# Patient Record
Sex: MALE | Race: OTHER | HISPANIC OR LATINO | URBAN - METROPOLITAN AREA
[De-identification: names, ages, dates, MRNs, and addresses within clinical notes are randomized per-mention and may not be internally consistent; named-entity substitution may affect disease eponyms.]

---

## 2022-04-05 ENCOUNTER — OFFICE VISIT (OUTPATIENT)
Dept: URBAN - METROPOLITAN AREA TELEHEALTH 2 | Facility: TELEHEALTH | Age: 67
End: 2022-04-05

## 2022-04-26 ENCOUNTER — OFFICE VISIT (OUTPATIENT)
Dept: URBAN - METROPOLITAN AREA CLINIC 63 | Facility: CLINIC | Age: 67
End: 2022-04-26

## 2022-04-29 LAB
% SATURATION: (no result)
ABSOLUTE BASOPHILS: (no result)
ABSOLUTE EOSINOPHILS: (no result)
ABSOLUTE LYMPHOCYTES: (no result)
ABSOLUTE MONOCYTES: (no result)
ABSOLUTE NEUTROPHILS: (no result)
ALBUMIN/GLOBULIN RATIO: (no result)
ALBUMIN: (no result)
ALKALINE PHOSPHATASE: (no result)
ALPHA FETOPROTEIN, TUMOR MARKER: (no result)
ALT: (no result)
ANACHOICE(R) SCREEN: NEGATIVE
AST: (no result)
BASOPHILS: (no result)
BILIRUBIN, DIRECT: (no result)
BILIRUBIN, TOTAL: (no result)
BUN/CREATININE RATIO: (no result)
CALCIUM: (no result)
CARBON DIOXIDE: (no result)
CERULOPLASMIN: (no result)
CHLORIDE: (no result)
CREATININE: (no result)
EGFR AFRICAN AMERIC: (no result)
EGFR NON-AFR. AMERI: (no result)
EOSINOPHILS: (no result)
FERRITIN: (no result)
GLOBULIN: (no result)
GLUCOSE: (no result)
HCV RNA, QUANTITATIVE REAL TI: (no result)
HCV RNA, QUANTITATIVE REAL TIME: (no result)
HEMATOCRIT: (no result)
HEMOGLOBIN: (no result)
HEPATITIS A AB, TOTAL: REACTIVE
HEPATITIS A IGM: (no result)
HEPATITIS B CORE AB TOTAL: (no result)
HEPATITIS B CORE ANTIBODY (IGM): (no result)
HEPATITIS B SURFACE ANTIBODY QL: (no result)
HEPATITIS B SURFACE ANTIGEN: (no result)
IMMUNOGLOBULIN A: (no result)
IMMUNOGLOBULIN G: (no result)
IMMUNOGLOBULIN M: (no result)
INR: 1.3
IRON BINDING CAPACITY: (no result)
IRON, TOTAL: (no result)
LYMPHOCYTES: (no result)
MCH: (no result)
MCHC: (no result)
MCV: (no result)
MITOCHONDRIAL AB SCREEN: NEGATIVE
MONOCYTES: (no result)
MPV: (no result)
NEUTROPHILS: (no result)
PLATELET COUNT: (no result)
POTASSIUM: (no result)
PROTEIN, TOTAL: (no result)
PT: (no result)
RDW: (no result)
RED BLOOD CELL COUNT: (no result)
SMOOTH MUSCLE AB SCREEN: NEGATIVE
SODIUM: (no result)
UREA NITROGEN (BUN): (no result)
WHITE BLOOD CELL COUNT: (no result)

## 2022-06-07 ENCOUNTER — OFFICE VISIT (OUTPATIENT)
Dept: URBAN - METROPOLITAN AREA TELEHEALTH 2 | Facility: TELEHEALTH | Age: 67
End: 2022-06-07

## 2022-06-20 ENCOUNTER — OFFICE VISIT (OUTPATIENT)
Dept: URBAN - METROPOLITAN AREA MEDICAL CENTER 14 | Facility: MEDICAL CENTER | Age: 67
End: 2022-06-20

## 2022-07-09 ENCOUNTER — TELEPHONE ENCOUNTER (OUTPATIENT)
Dept: URBAN - METROPOLITAN AREA CLINIC 121 | Facility: CLINIC | Age: 67
End: 2022-07-09

## 2022-07-09 RX ORDER — SIMVASTATIN 40 MG/1
ONCE A DAY TABLET, FILM COATED ORAL ONCE A DAY
Refills: 3 | OUTPATIENT
Start: 2017-01-20 | End: 2017-03-21

## 2022-07-09 RX ORDER — SIMVASTATIN 40 MG/1
ONCE A DAY TABLET, FILM COATED ORAL ONCE A DAY
Refills: 3 | OUTPATIENT
Start: 2016-10-06 | End: 2017-01-20

## 2022-07-09 RX ORDER — NADOLOL 20 MG/1
TABLET ORAL
Refills: 0 | OUTPATIENT
Start: 2017-03-21 | End: 2017-06-12

## 2022-07-09 RX ORDER — SITAGLIPTIN PHOSPHATE 100 MG
TABLET ORAL
Refills: 0 | OUTPATIENT
Start: 2016-09-07 | End: 2016-10-06

## 2022-07-09 RX ORDER — GLIMEPIRIDE 2 MG/1
TABLET ORAL TWICE A DAY
Refills: 0 | OUTPATIENT
Start: 2017-06-12 | End: 2019-07-09

## 2022-07-09 RX ORDER — LOSARTAN POTASSIUM 50 MG/1
TABLET, FILM COATED ORAL
Refills: 0 | OUTPATIENT
Start: 2016-09-07 | End: 2016-10-06

## 2022-07-09 RX ORDER — COLCHICINE 0.6 MG/1
TABLET, FILM COATED ORAL TWICE A DAY
Refills: 0 | OUTPATIENT
Start: 2017-06-12 | End: 2019-07-09

## 2022-07-09 RX ORDER — COLCHICINE 0.6 MG/1
TABLET, FILM COATED ORAL TWICE A DAY
Refills: 0 | OUTPATIENT
Start: 2016-09-07 | End: 2016-10-06

## 2022-07-09 RX ORDER — LOSARTAN POTASSIUM 50 MG/1
TABLET, FILM COATED ORAL
Refills: 0 | OUTPATIENT
Start: 2017-03-21 | End: 2017-06-12

## 2022-07-09 RX ORDER — LACTULOSE 10 G/15ML
TWICE A DAY TAKE 30 ML PO BID SOLUTION ORAL TWICE A DAY
Refills: 0 | OUTPATIENT
Start: 2016-10-06 | End: 2017-06-12

## 2022-07-09 RX ORDER — SITAGLIPTIN PHOSPHATE 100 MG
TABLET ORAL
Refills: 0 | OUTPATIENT
Start: 2016-10-06 | End: 2017-03-21

## 2022-07-09 RX ORDER — OMEPRAZOLE 20 MG/1
CAPSULE, DELAYED RELEASE ORAL
Refills: 0 | OUTPATIENT
Start: 2017-03-21 | End: 2017-06-12

## 2022-07-09 RX ORDER — LORATADINE 10 MG/1
TABLET ORAL
Refills: 0 | OUTPATIENT
Start: 2017-03-21 | End: 2017-06-12

## 2022-07-09 RX ORDER — METFORMIN HCL 1000 MG/1
TABLET ORAL TWICE A DAY
Refills: 0 | OUTPATIENT
Start: 2017-03-21 | End: 2017-06-12

## 2022-07-09 RX ORDER — METFORMIN HCL 1000 MG/1
TABLET ORAL TWICE A DAY
Refills: 0 | OUTPATIENT
Start: 2016-10-06 | End: 2017-03-21

## 2022-07-09 RX ORDER — OMEPRAZOLE 20 MG/1
CAPSULE, DELAYED RELEASE ORAL
Refills: 0 | OUTPATIENT
Start: 2017-06-12 | End: 2019-07-09

## 2022-07-09 RX ORDER — OMEPRAZOLE 20 MG/1
ONCE A DAY CAPSULE, DELAYED RELEASE ORAL ONCE A DAY
Refills: 1 | OUTPATIENT
Start: 2017-02-13 | End: 2017-03-21

## 2022-07-09 RX ORDER — AMLODIPINE BESYLATE 2.5 MG/1
TABLET ORAL
Refills: 0 | OUTPATIENT
Start: 2016-09-07 | End: 2016-10-06

## 2022-07-09 RX ORDER — METFORMIN HCL 1000 MG/1
TABLET ORAL TWICE A DAY
Refills: 0 | OUTPATIENT
Start: 2017-06-12 | End: 2019-07-09

## 2022-07-09 RX ORDER — LOSARTAN POTASSIUM 50 MG/1
TABLET, FILM COATED ORAL
Refills: 0 | OUTPATIENT
Start: 2016-10-06 | End: 2017-03-21

## 2022-07-09 RX ORDER — FUROSEMIDE 20 MG/1
ONCE A DAY TABLET ORAL ONCE A DAY
Refills: 3 | OUTPATIENT
Start: 2017-03-21 | End: 2017-06-27

## 2022-07-09 RX ORDER — FEBUXOSTAT 80 MG/1
TABLET ORAL
Refills: 0 | OUTPATIENT
Start: 2016-09-07 | End: 2016-10-06

## 2022-07-09 RX ORDER — RIFAXIMIN 550 MG/1
TWICE A DAY TABLET ORAL TWICE A DAY
Refills: 3 | OUTPATIENT
Start: 2017-03-21 | End: 2017-06-12

## 2022-07-09 RX ORDER — LORATADINE 10 MG/1
TABLET ORAL
Refills: 0 | OUTPATIENT
Start: 2016-09-07 | End: 2016-10-06

## 2022-07-09 RX ORDER — OMEPRAZOLE 20 MG/1
ONCE A DAY CAPSULE, DELAYED RELEASE ORAL ONCE A DAY
Refills: 3 | OUTPATIENT
Start: 2016-10-06 | End: 2017-02-13

## 2022-07-09 RX ORDER — GLIMEPIRIDE 2 MG/1
TABLET ORAL TWICE A DAY
Refills: 0 | OUTPATIENT
Start: 2016-10-06 | End: 2017-03-21

## 2022-07-09 RX ORDER — GLIMEPIRIDE 2 MG/1
TABLET ORAL TWICE A DAY
Refills: 0 | OUTPATIENT
Start: 2016-09-07 | End: 2016-10-06

## 2022-07-09 RX ORDER — NADOLOL 20 MG/1
ONCE A DAY TABLET ORAL ONCE A DAY
Refills: 0 | OUTPATIENT
Start: 2017-01-09 | End: 2017-03-21

## 2022-07-09 RX ORDER — AMLODIPINE BESYLATE 2.5 MG/1
TABLET ORAL
Refills: 0 | OUTPATIENT
Start: 2017-03-21 | End: 2017-06-12

## 2022-07-09 RX ORDER — FEBUXOSTAT 80 MG/1
TABLET ORAL
Refills: 0 | OUTPATIENT
Start: 2019-07-09 | End: 2019-07-09

## 2022-07-09 RX ORDER — LACTULOSE 10 G/15ML
TWICE A DAY TAKE 30 ML PO BID SOLUTION ORAL TWICE A DAY
Refills: 0 | OUTPATIENT
Start: 2017-06-12 | End: 2019-07-09

## 2022-07-09 RX ORDER — SUCRALFATE 1 G/1
TWICE A DAY TABLET ORAL TWICE A DAY
Refills: 1 | OUTPATIENT
Start: 2016-10-06 | End: 2016-11-28

## 2022-07-09 RX ORDER — NADOLOL 20 MG/1
ONCE A DAY TABLET ORAL ONCE A DAY
Refills: 2 | OUTPATIENT
Start: 2017-06-12 | End: 2019-07-09

## 2022-07-09 RX ORDER — FEBUXOSTAT 80 MG/1
TABLET ORAL
Refills: 0 | OUTPATIENT
Start: 2017-06-12 | End: 2019-07-09

## 2022-07-09 RX ORDER — COLCHICINE 0.6 MG/1
TABLET, FILM COATED ORAL TWICE A DAY
Refills: 0 | OUTPATIENT
Start: 2016-10-06 | End: 2017-03-21

## 2022-07-09 RX ORDER — FEBUXOSTAT 80 MG/1
TABLET ORAL
Refills: 0 | OUTPATIENT
Start: 2017-03-21 | End: 2017-06-12

## 2022-07-09 RX ORDER — SITAGLIPTIN PHOSPHATE 100 MG
TABLET ORAL
Refills: 0 | OUTPATIENT
Start: 2017-06-12 | End: 2019-07-09

## 2022-07-09 RX ORDER — COLCHICINE 0.6 MG/1
TABLET, FILM COATED ORAL TWICE A DAY
Refills: 0 | OUTPATIENT
Start: 2017-03-21 | End: 2017-06-12

## 2022-07-09 RX ORDER — SITAGLIPTIN PHOSPHATE 100 MG
TABLET ORAL
Refills: 0 | OUTPATIENT
Start: 2017-03-21 | End: 2017-06-12

## 2022-07-09 RX ORDER — AMLODIPINE BESYLATE 2.5 MG/1
TABLET ORAL
Refills: 0 | OUTPATIENT
Start: 2016-10-06 | End: 2017-03-21

## 2022-07-09 RX ORDER — LORATADINE 10 MG/1
TABLET ORAL
Refills: 0 | OUTPATIENT
Start: 2016-10-06 | End: 2017-03-21

## 2022-07-09 RX ORDER — SIMVASTATIN 40 MG/1
TABLET, FILM COATED ORAL
Refills: 0 | OUTPATIENT
Start: 2017-03-21 | End: 2017-06-12

## 2022-07-09 RX ORDER — METFORMIN HCL 1000 MG/1
TABLET ORAL TWICE A DAY
Refills: 0 | OUTPATIENT
Start: 2016-09-07 | End: 2016-10-06

## 2022-07-09 RX ORDER — GLIMEPIRIDE 2 MG/1
TABLET ORAL TWICE A DAY
Refills: 0 | OUTPATIENT
Start: 2017-03-21 | End: 2017-06-12

## 2022-07-09 RX ORDER — FEBUXOSTAT 80 MG/1
TABLET ORAL
Refills: 0 | OUTPATIENT
Start: 2016-10-06 | End: 2017-03-21

## 2022-07-09 RX ORDER — RIFAXIMIN 550 MG/1
TWICE A DAY TABLET ORAL TWICE A DAY
Refills: 3 | OUTPATIENT
Start: 2017-06-12 | End: 2019-07-09

## 2022-07-10 ENCOUNTER — TELEPHONE ENCOUNTER (OUTPATIENT)
Dept: URBAN - METROPOLITAN AREA CLINIC 121 | Facility: CLINIC | Age: 67
End: 2022-07-10

## 2022-07-10 RX ORDER — TAMSULOSIN HYDROCHLORIDE 0.4 MG/1
CAPSULE ORAL
Refills: 0 | Status: ACTIVE | COMMUNITY
Start: 2019-07-09

## 2022-07-10 RX ORDER — ATORVASTATIN CALCIUM 20 MG/1
TABLET, FILM COATED ORAL
Refills: 0 | Status: ACTIVE | COMMUNITY
Start: 2019-07-09

## 2022-07-10 RX ORDER — DAPAGLIFLOZIN 10 MG/1
TABLET, FILM COATED ORAL
Refills: 0 | Status: ACTIVE | COMMUNITY
Start: 2019-07-09

## 2022-07-10 RX ORDER — BISACODYL 5 MG/1
TABLET, SUGAR COATED ORAL TWICE A DAY
Refills: 0 | Status: ACTIVE | COMMUNITY
Start: 2019-07-09

## 2022-07-10 RX ORDER — CETIRIZINE HYDROCHLORIDE 10 MG/1
TABLET, FILM COATED ORAL
Refills: 0 | Status: ACTIVE | COMMUNITY
Start: 2019-07-09

## 2022-07-10 RX ORDER — INSULIN GLARGINE AND LIXISENATIDE 100; 33 U/ML; UG/ML
INJECTION, SOLUTION SUBCUTANEOUS
Refills: 0 | Status: ACTIVE | COMMUNITY
Start: 2019-07-09

## 2022-07-10 RX ORDER — NADOLOL 20 MG/1
TABLET ORAL
Refills: 0 | Status: ACTIVE | COMMUNITY
Start: 2019-07-09

## 2022-07-10 RX ORDER — GABAPENTIN 300 MG/1
CAPSULE ORAL TWICE A DAY
Refills: 0 | Status: ACTIVE | COMMUNITY
Start: 2019-07-09

## 2022-07-10 RX ORDER — SUCRALFATE 1 G/1
TOMAR UNA TABLETA VIA ORAL DOS VECES AL DIA TABLET ORAL
Refills: 0 | Status: ACTIVE | COMMUNITY
Start: 2016-11-28

## 2022-07-10 RX ORDER — COLCHICINE 0.6 MG/1
TABLET, FILM COATED ORAL TWICE A DAY
Refills: 0 | Status: ACTIVE | COMMUNITY
Start: 2019-07-09

## 2022-07-10 RX ORDER — FUROSEMIDE 20 MG/1
TOMAR 1 TABLETA POR LA BOCA UNA VEZ AL DIA TABLET ORAL
Refills: 3 | Status: ACTIVE | COMMUNITY
Start: 2017-06-27

## 2022-07-10 RX ORDER — INSULIN ASPART 100 [IU]/ML
30 UNIT DAILY INJECTION, SOLUTION INTRAVENOUS; SUBCUTANEOUS
Refills: 0 | Status: ACTIVE | COMMUNITY
Start: 2019-07-09

## 2022-07-10 RX ORDER — METFORMIN HCL 1000 MG/1
TABLET ORAL TWICE A DAY
Refills: 0 | Status: ACTIVE | COMMUNITY
Start: 2019-07-09

## 2022-07-10 RX ORDER — FEBUXOSTAT 80 MG/1
TABLET ORAL
Refills: 0 | Status: ACTIVE | COMMUNITY
Start: 2019-07-09

## 2022-07-10 RX ORDER — RIFAXIMIN 550 MG/1
TWICE A DAY TABLET ORAL TWICE A DAY
Refills: 3 | Status: ACTIVE | COMMUNITY
Start: 2016-10-06

## 2022-07-10 RX ORDER — LACTULOSE 10 G/15ML
TWICE A DAY 30 ML PO SOLUTION ORAL TWICE A DAY
Refills: 4 | Status: ACTIVE | COMMUNITY
Start: 2017-03-21

## 2022-07-13 ENCOUNTER — OFFICE VISIT (OUTPATIENT)
Dept: URBAN - METROPOLITAN AREA CLINIC 63 | Facility: CLINIC | Age: 67
End: 2022-07-13

## 2022-07-30 ENCOUNTER — TELEPHONE ENCOUNTER (OUTPATIENT)
Age: 67
End: 2022-07-30

## 2022-07-31 ENCOUNTER — TELEPHONE ENCOUNTER (OUTPATIENT)
Age: 67
End: 2022-07-31

## 2022-08-09 ENCOUNTER — OFFICE VISIT (OUTPATIENT)
Dept: URBAN - METROPOLITAN AREA TELEHEALTH 1 | Facility: TELEHEALTH | Age: 67
End: 2022-08-09
Payer: COMMERCIAL

## 2022-08-09 DIAGNOSIS — Z12.11 COLON CANCER SCREENING: ICD-10-CM

## 2022-08-09 PROCEDURE — 99213 OFFICE O/P EST LOW 20 MIN: CPT | Performed by: INTERNAL MEDICINE

## 2022-08-09 RX ORDER — DAPAGLIFLOZIN 10 MG/1
TABLET, FILM COATED ORAL
Refills: 0 | Status: ACTIVE | COMMUNITY
Start: 2019-07-09

## 2022-08-09 RX ORDER — TAMSULOSIN HYDROCHLORIDE 0.4 MG/1
CAPSULE ORAL
Refills: 0 | Status: ACTIVE | COMMUNITY
Start: 2019-07-09

## 2022-08-09 RX ORDER — LACTULOSE 10 G/15ML
TWICE A DAY 30 ML PO SOLUTION ORAL TWICE A DAY
Refills: 4 | Status: ACTIVE | COMMUNITY
Start: 2017-03-21

## 2022-08-09 RX ORDER — RIFAXIMIN 550 MG/1
TWICE A DAY TABLET ORAL TWICE A DAY
Refills: 3 | Status: ACTIVE | COMMUNITY
Start: 2016-10-06

## 2022-08-09 RX ORDER — BISACODYL 5 MG/1
TABLET, SUGAR COATED ORAL TWICE A DAY
Refills: 0 | Status: ACTIVE | COMMUNITY
Start: 2019-07-09

## 2022-08-09 RX ORDER — INSULIN GLARGINE AND LIXISENATIDE 100; 33 U/ML; UG/ML
INJECTION, SOLUTION SUBCUTANEOUS
Refills: 0 | Status: ACTIVE | COMMUNITY
Start: 2019-07-09

## 2022-08-09 RX ORDER — FUROSEMIDE 20 MG/1
TOMAR 1 TABLETA POR LA BOCA UNA VEZ AL DIA TABLET ORAL
Refills: 3 | Status: ACTIVE | COMMUNITY
Start: 2017-06-27

## 2022-08-09 RX ORDER — INSULIN ASPART 100 [IU]/ML
30 UNIT DAILY INJECTION, SOLUTION INTRAVENOUS; SUBCUTANEOUS
Refills: 0 | Status: ACTIVE | COMMUNITY
Start: 2019-07-09

## 2022-08-09 RX ORDER — METFORMIN HCL 1000 MG/1
TABLET ORAL TWICE A DAY
Refills: 0 | Status: ACTIVE | COMMUNITY
Start: 2019-07-09

## 2022-08-09 RX ORDER — FEBUXOSTAT 80 MG/1
TABLET ORAL
Refills: 0 | Status: ACTIVE | COMMUNITY
Start: 2019-07-09

## 2022-08-09 RX ORDER — NADOLOL 20 MG/1
TABLET ORAL
Refills: 0 | Status: ACTIVE | COMMUNITY
Start: 2019-07-09

## 2022-08-09 RX ORDER — GABAPENTIN 300 MG/1
CAPSULE ORAL TWICE A DAY
Refills: 0 | Status: ACTIVE | COMMUNITY
Start: 2019-07-09

## 2022-08-09 RX ORDER — CETIRIZINE HYDROCHLORIDE 10 MG/1
TABLET, FILM COATED ORAL
Refills: 0 | Status: ACTIVE | COMMUNITY
Start: 2019-07-09

## 2022-08-09 RX ORDER — COLCHICINE 0.6 MG/1
TABLET, FILM COATED ORAL TWICE A DAY
Refills: 0 | Status: ACTIVE | COMMUNITY
Start: 2019-07-09

## 2022-08-09 RX ORDER — ATORVASTATIN CALCIUM 20 MG/1
TABLET, FILM COATED ORAL
Refills: 0 | Status: ACTIVE | COMMUNITY
Start: 2019-07-09

## 2022-08-09 RX ORDER — SUCRALFATE 1 G/1
TOMAR UNA TABLETA VIA ORAL DOS VECES AL DIA TABLET ORAL
Refills: 0 | Status: ACTIVE | COMMUNITY
Start: 2016-11-28

## 2022-08-09 NOTE — HPI-TODAY'S VISIT:
Patient seen today via telehealth by agreement and consent of patient in light of current COVID-19 pandemic. I used video conferencing during the visit. The patient encounter is appropriate and reasonable under the circumstances given the patient's particular presentation at this time. The patient has been advised of the followin) the potential risks and limitations of this mode of treatment (including but not limited to the absence of in-person examination); 2) the right to refuse telehealth services at any point without affecting the right to future care; 3) the right to receive in-person services, included immediately after this consultation if an urgent need arises; 4) information, including identifiable images or information from this telehealth consult, will only be shared in accordance with HIPPA regulations. Any and all of the patient's and/or patient's family member's questions on this issue have been answered. The patient has verbally consented to be treated via telehealth services. The patient has also been advised to contact this office for worsening conditions or problems, and seek emergency medical treatment and/or call 911 if the patient deems either necessary.   More than half of the face-to-face time used for counseling and coordination of care.  The patient received telehealth services at: home  10/16Patient with good general state, with the complaint above.MELD score is 16Elevated CEA and liver enzymes Normal  AFP.Doppler done  No ascites and normal portal flowRUQ us: smooth liver contour, no ascites, no cholecystitis.EGD with evidence of esophageal varice grade I-II and portal gastropathy No sign of infection, edema, bleeding or AMS. Wife state patient is lethargic at times and complaints  of difficulty to concentrate in his thoughts.Plan:Continue with Nadolol, will start lactulose and Xifaxan, no diuretics needs for now, he will have control in his fluids intake.Referral to a tertiary center. Probably TIP.3/17Patient with elevated ammonia level. Patient and wife advised that he should not drive due to the high risk of  have a Car accident. due to encephalopathy. MELD score is 9.Patient with ascites, elevated ammonia, weakness tired, sleepy. Bilateral legs edema. Plan: Continue with Nadolol, lactulose, will start with furosemide 20 mg and xifaxan. Diet low in Na and fluid restrictionwill do abdomen US to evaluated for paracentesis / patient with no abdominal discomfort, claim some SOB and increasing in weight.Patient went to [facility] /  candidate for transplant.  : patient with cirrhosis with evidence of esophageal varices, no banding, next EGD in . no evidence of ascites will do ultrasound and will check ammonia and AFP. Will follow in 2 months.  : pateint comes after 2 years, pateint as no follow up with us for his cirrhosis will need labs and ultrasound  as per patient was check by his PCP. Will follow closkimberly and will review the labs to plan EGD and colonoscopy. heme positive in stool. Will plan first cardiac clearcamce and  will plan EGD and colonoscopy at [facility].  :  Patient with cirrhosis last seen in , 3 years ago with no further follow-up.  Patient had an upper endoscopy that year with evidence of esophageal varices grade 3 at that is post banding and food was pertaining to the stomach, reason why a colonoscopy could not be performed.  Was recommended close follow-up in few weeks to repeat endoscopy and prep for the colonoscopy, gastric empty study was performed and was normal.  Patient never follow-up with us, until now.Patient feels well in the past has a low meld score, will need labs and an ultrasound with Doppler of the abdomen.  Will check AFP, will calculate meld score, will check ammonia levels, and will do a close follow-up to arrange endoscopy and colonoscopy.  This follow-up will be in the office NO through telehealth for clinical evaluation and to plan endoscopic evaluation.  :  Patient had ultrasound of the liver and Doppler with normal direct flow within the portal splenic venous system.  SMV was not visualized.  Patent hepatic vein and artery.  Mild hepatitis and moderate splenomegaly.  Gallbladder and biliary ducts are normal, liver length of 15.6 cm the liver demonstrate normal shape and intravenous echotexture consistent with history of cirrhosis.  No mass was visualized.  Labs were performed with normal AFP just mild elevation of glucose and CBC with platelet count 141,000, normal hemoglobin.  Pending ammonia level, partial results. AFP 1.6. Will plan EGD and colonoscopy at the hospital. : Patient had  upper endoscopy and colonoscopy.  The upper endoscopy showed distal esophageal varices grade 1 through 2 no stigmata of recent bleeding no banding was performed, small hiatal hernia, portal gastropathy, small polyp of the antrum that could be inflammatory biopsies were taken, and normal duodenum.  The colonoscopy showed rectal varices, small internal hemorrhoids, and the preparation was inadequate in some areas will need to repeat colonoscopy within 3 months pathology report showed gastric polyp polypoid mucosa demonstrating reactive and hyperplastic features, no dysplasia or malignancy because of the poor preparation of the colon we will need to repeat colonoscopy again will need to be done at the hospital setting.

## 2022-08-16 ENCOUNTER — LAB OUTSIDE AN ENCOUNTER (OUTPATIENT)
Dept: URBAN - METROPOLITAN AREA TELEHEALTH 1 | Facility: TELEHEALTH | Age: 67
End: 2022-08-16

## 2022-10-31 ENCOUNTER — APPOINTMENT (RX ONLY)
Dept: URBAN - METROPOLITAN AREA CLINIC 148 | Facility: CLINIC | Age: 67
Setting detail: DERMATOLOGY
End: 2022-10-31

## 2022-10-31 DIAGNOSIS — B86 SCABIES: ICD-10-CM

## 2022-10-31 PROBLEM — L30.9 DERMATITIS, UNSPECIFIED: Status: ACTIVE | Noted: 2022-10-31

## 2022-10-31 PROCEDURE — ? COUNSELING

## 2022-10-31 PROCEDURE — 99204 OFFICE O/P NEW MOD 45 MIN: CPT

## 2022-10-31 PROCEDURE — ? PRESCRIPTION

## 2022-10-31 PROCEDURE — ? TREATMENT REGIMEN

## 2022-10-31 RX ORDER — IVERMECTIN 3 MG/1
TABLET ORAL
Qty: 12 | Refills: 0 | Status: ERX | COMMUNITY
Start: 2022-10-31

## 2022-10-31 RX ORDER — TRIAMCINOLONE ACETONIDE 1 MG/G
CREAM TOPICAL
Qty: 453.6 | Refills: 0 | Status: ERX | COMMUNITY
Start: 2022-10-31

## 2022-10-31 RX ORDER — PERMETHRIN 50 MG/G
CREAM TOPICAL QD
Qty: 120 | Refills: 0 | Status: ERX | COMMUNITY
Start: 2022-10-31

## 2022-10-31 RX ADMIN — PERMETHRIN: 50 CREAM TOPICAL at 00:00

## 2022-10-31 RX ADMIN — TRIAMCINOLONE ACETONIDE: 1 CREAM TOPICAL at 00:00

## 2022-10-31 RX ADMIN — IVERMECTIN: 3 TABLET ORAL at 00:00

## 2022-10-31 ASSESSMENT — LOCATION ZONE DERM: LOCATION ZONE: TRUNK

## 2022-10-31 ASSESSMENT — LOCATION SIMPLE DESCRIPTION DERM: LOCATION SIMPLE: LEFT UPPER BACK

## 2022-10-31 ASSESSMENT — LOCATION DETAILED DESCRIPTION DERM: LOCATION DETAILED: LEFT MEDIAL UPPER BACK

## 2024-05-29 ENCOUNTER — OFFICE VISIT (OUTPATIENT)
Dept: URBAN - METROPOLITAN AREA CLINIC 63 | Facility: CLINIC | Age: 69
End: 2024-05-29

## 2024-05-29 RX ORDER — INSULIN ASPART 100 [IU]/ML
30 UNIT DAILY INJECTION, SOLUTION INTRAVENOUS; SUBCUTANEOUS
Refills: 0 | Status: ACTIVE | COMMUNITY
Start: 2019-07-09

## 2024-05-29 RX ORDER — METFORMIN HCL 1000 MG/1
TABLET ORAL TWICE A DAY
Refills: 0 | Status: ACTIVE | COMMUNITY
Start: 2019-07-09

## 2024-05-29 RX ORDER — CETIRIZINE HYDROCHLORIDE 10 MG/1
TABLET, FILM COATED ORAL
Refills: 0 | Status: ACTIVE | COMMUNITY
Start: 2019-07-09

## 2024-05-29 RX ORDER — RIFAXIMIN 550 MG/1
TWICE A DAY TABLET ORAL TWICE A DAY
Refills: 3 | Status: ACTIVE | COMMUNITY
Start: 2016-10-06

## 2024-05-29 RX ORDER — SUCRALFATE 1 G/1
TOMAR UNA TABLETA VIA ORAL DOS VECES AL DIA TABLET ORAL
Refills: 0 | Status: ACTIVE | COMMUNITY
Start: 2016-11-28

## 2024-05-29 RX ORDER — GABAPENTIN 300 MG/1
CAPSULE ORAL TWICE A DAY
Refills: 0 | Status: ACTIVE | COMMUNITY
Start: 2019-07-09

## 2024-05-29 RX ORDER — TAMSULOSIN HYDROCHLORIDE 0.4 MG/1
CAPSULE ORAL
Refills: 0 | Status: ACTIVE | COMMUNITY
Start: 2019-07-09

## 2024-05-29 RX ORDER — NADOLOL 20 MG/1
TABLET ORAL
Refills: 0 | Status: ACTIVE | COMMUNITY
Start: 2019-07-09

## 2024-05-29 RX ORDER — COLCHICINE 0.6 MG/1
TABLET, FILM COATED ORAL TWICE A DAY
Refills: 0 | Status: ACTIVE | COMMUNITY
Start: 2019-07-09

## 2024-05-29 RX ORDER — DAPAGLIFLOZIN 10 MG/1
TABLET, FILM COATED ORAL
Refills: 0 | Status: ACTIVE | COMMUNITY
Start: 2019-07-09

## 2024-05-29 RX ORDER — BISACODYL 5 MG/1
TABLET, SUGAR COATED ORAL TWICE A DAY
Refills: 0 | Status: ACTIVE | COMMUNITY
Start: 2019-07-09

## 2024-05-29 RX ORDER — LACTULOSE 10 G/15ML
TWICE A DAY 30 ML PO SOLUTION ORAL TWICE A DAY
Refills: 4 | Status: ACTIVE | COMMUNITY
Start: 2017-03-21

## 2024-05-29 RX ORDER — FUROSEMIDE 20 MG/1
TOMAR 1 TABLETA POR LA BOCA UNA VEZ AL DIA TABLET ORAL
Refills: 3 | Status: ACTIVE | COMMUNITY
Start: 2017-06-27

## 2024-05-29 RX ORDER — INSULIN GLARGINE AND LIXISENATIDE 100; 33 U/ML; UG/ML
INJECTION, SOLUTION SUBCUTANEOUS
Refills: 0 | Status: ACTIVE | COMMUNITY
Start: 2019-07-09

## 2024-05-29 RX ORDER — FEBUXOSTAT 80 MG/1
TABLET ORAL
Refills: 0 | Status: ACTIVE | COMMUNITY
Start: 2019-07-09

## 2024-05-29 RX ORDER — ATORVASTATIN CALCIUM 20 MG/1
TABLET, FILM COATED ORAL
Refills: 0 | Status: ACTIVE | COMMUNITY
Start: 2019-07-09

## 2024-10-23 ENCOUNTER — OFFICE VISIT (OUTPATIENT)
Dept: URBAN - METROPOLITAN AREA CLINIC 63 | Facility: CLINIC | Age: 69
End: 2024-10-23

## 2024-10-23 RX ORDER — FUROSEMIDE 20 MG/1
TOMAR 1 TABLETA POR LA BOCA UNA VEZ AL DIA TABLET ORAL
Refills: 3 | Status: ACTIVE | COMMUNITY
Start: 2017-06-27

## 2024-10-23 RX ORDER — CETIRIZINE HYDROCHLORIDE 10 MG/1
TABLET, FILM COATED ORAL
Refills: 0 | Status: ACTIVE | COMMUNITY
Start: 2019-07-09

## 2024-10-23 RX ORDER — GABAPENTIN 300 MG/1
CAPSULE ORAL TWICE A DAY
Refills: 0 | Status: ACTIVE | COMMUNITY
Start: 2019-07-09

## 2024-10-23 RX ORDER — DAPAGLIFLOZIN 10 MG/1
TABLET, FILM COATED ORAL
Refills: 0 | Status: ACTIVE | COMMUNITY
Start: 2019-07-09

## 2024-10-23 RX ORDER — LACTULOSE 10 G/15ML
TWICE A DAY 30 ML PO SOLUTION ORAL TWICE A DAY
Refills: 4 | Status: ACTIVE | COMMUNITY
Start: 2017-03-21

## 2024-10-23 RX ORDER — TAMSULOSIN HYDROCHLORIDE 0.4 MG/1
CAPSULE ORAL
Refills: 0 | Status: ACTIVE | COMMUNITY
Start: 2019-07-09

## 2024-10-23 RX ORDER — FEBUXOSTAT 80 MG/1
TABLET ORAL
Refills: 0 | Status: ACTIVE | COMMUNITY
Start: 2019-07-09

## 2024-10-23 RX ORDER — BISACODYL 5 MG/1
TABLET, SUGAR COATED ORAL TWICE A DAY
Refills: 0 | Status: ACTIVE | COMMUNITY
Start: 2019-07-09

## 2024-10-23 RX ORDER — METFORMIN HCL 1000 MG/1
TABLET ORAL TWICE A DAY
Refills: 0 | Status: ACTIVE | COMMUNITY
Start: 2019-07-09

## 2024-10-23 RX ORDER — RIFAXIMIN 550 MG/1
TWICE A DAY TABLET ORAL TWICE A DAY
Refills: 3 | Status: ACTIVE | COMMUNITY
Start: 2016-10-06

## 2024-10-23 RX ORDER — COLCHICINE 0.6 MG/1
TABLET, FILM COATED ORAL TWICE A DAY
Refills: 0 | Status: ACTIVE | COMMUNITY
Start: 2019-07-09

## 2024-10-23 RX ORDER — INSULIN GLARGINE AND LIXISENATIDE 100; 33 U/ML; UG/ML
INJECTION, SOLUTION SUBCUTANEOUS
Refills: 0 | Status: ACTIVE | COMMUNITY
Start: 2019-07-09

## 2024-10-23 RX ORDER — NADOLOL 20 MG/1
TABLET ORAL
Refills: 0 | Status: ACTIVE | COMMUNITY
Start: 2019-07-09

## 2024-10-23 RX ORDER — ATORVASTATIN CALCIUM 20 MG/1
TABLET, FILM COATED ORAL
Refills: 0 | Status: ACTIVE | COMMUNITY
Start: 2019-07-09

## 2024-10-23 RX ORDER — INSULIN ASPART 100 [IU]/ML
30 UNIT DAILY INJECTION, SOLUTION INTRAVENOUS; SUBCUTANEOUS
Refills: 0 | Status: ACTIVE | COMMUNITY
Start: 2019-07-09

## 2024-10-23 RX ORDER — SUCRALFATE 1 G/1
TOMAR UNA TABLETA VIA ORAL DOS VECES AL DIA TABLET ORAL
Refills: 0 | Status: ACTIVE | COMMUNITY
Start: 2016-11-28

## 2024-12-04 ENCOUNTER — OFFICE VISIT (OUTPATIENT)
Dept: URBAN - METROPOLITAN AREA CLINIC 63 | Facility: CLINIC | Age: 69
End: 2024-12-04

## 2025-01-13 ENCOUNTER — LAB OUTSIDE AN ENCOUNTER (OUTPATIENT)
Dept: URBAN - METROPOLITAN AREA CLINIC 63 | Facility: CLINIC | Age: 70
End: 2025-01-13

## 2025-01-13 ENCOUNTER — OFFICE VISIT (OUTPATIENT)
Dept: URBAN - METROPOLITAN AREA CLINIC 63 | Facility: CLINIC | Age: 70
End: 2025-01-13
Payer: COMMERCIAL

## 2025-01-13 ENCOUNTER — DASHBOARD ENCOUNTERS (OUTPATIENT)
Age: 70
End: 2025-01-13

## 2025-01-13 VITALS
TEMPERATURE: 97.9 F | HEIGHT: 67 IN | DIASTOLIC BLOOD PRESSURE: 78 MMHG | OXYGEN SATURATION: 99 % | BODY MASS INDEX: 29.19 KG/M2 | WEIGHT: 186 LBS | SYSTOLIC BLOOD PRESSURE: 109 MMHG | HEART RATE: 61 BPM

## 2025-01-13 DIAGNOSIS — K70.30 ALCOHOLIC CIRRHOSIS OF LIVER WITHOUT ASCITES: ICD-10-CM

## 2025-01-13 DIAGNOSIS — E11.9 TYPE 2 DIABETES MELLITUS WITHOUT COMPLICATIONS: ICD-10-CM

## 2025-01-13 DIAGNOSIS — I85.00 ESOPHAGEAL VARICES WITHOUT BLEEDING (HCC): ICD-10-CM

## 2025-01-13 DIAGNOSIS — D12.6 COLON ADENOMA: ICD-10-CM

## 2025-01-13 DIAGNOSIS — R94.5 ABNORMAL RESULTS OF LIVER FUNCTION STUDIES: ICD-10-CM

## 2025-01-13 DIAGNOSIS — K74.00 FIBROSIS OF LIVER: ICD-10-CM

## 2025-01-13 PROCEDURE — 99214 OFFICE O/P EST MOD 30 MIN: CPT | Performed by: INTERNAL MEDICINE

## 2025-01-13 RX ORDER — GABAPENTIN 300 MG/1
CAPSULE ORAL TWICE A DAY
Refills: 0 | Status: ON HOLD | COMMUNITY
Start: 2019-07-09

## 2025-01-13 RX ORDER — TIRZEPATIDE 5 MG/.5ML
INJECTION, SOLUTION SUBCUTANEOUS
Qty: 2 MILLILITER | Status: ACTIVE | COMMUNITY

## 2025-01-13 RX ORDER — GLIPIZIDE AND METFORMIN HCL 5; 500 MG/1; MG/1
TOMAR 1 TABLETA VIA ORAL 2 VECES AL DIA TABLET, FILM COATED ORAL
Qty: 180 EACH | Refills: 0 | Status: ACTIVE | COMMUNITY

## 2025-01-13 RX ORDER — FUROSEMIDE 20 MG/1
TOMAR 1 TABLETA POR LA BOCA UNA VEZ AL DIA TABLET ORAL
Refills: 3 | Status: ON HOLD | COMMUNITY
Start: 2017-06-27

## 2025-01-13 RX ORDER — DICLOFENAC SODIUM 75 MG/1
TOMAR 1 TABLETA VIA ORAL 2 VECES AL DIA TABLET, DELAYED RELEASE ORAL
Qty: 60 EACH | Refills: 0 | Status: ACTIVE | COMMUNITY

## 2025-01-13 RX ORDER — CETIRIZINE HYDROCHLORIDE 10 MG/1
TABLET, FILM COATED ORAL
Refills: 0 | Status: ON HOLD | COMMUNITY
Start: 2019-07-09

## 2025-01-13 RX ORDER — SUCRALFATE 1 G/1
TOMAR UNA TABLETA VIA ORAL DOS VECES AL DIA TABLET ORAL
Refills: 0 | Status: ON HOLD | COMMUNITY
Start: 2016-11-28

## 2025-01-13 RX ORDER — METFORMIN HCL 1000 MG/1
TABLET ORAL TWICE A DAY
Refills: 0 | Status: ACTIVE | COMMUNITY
Start: 2019-07-09

## 2025-01-13 RX ORDER — BISACODYL 5 MG/1
TABLET, SUGAR COATED ORAL TWICE A DAY
Refills: 0 | Status: ACTIVE | COMMUNITY
Start: 2019-07-09

## 2025-01-13 RX ORDER — RIFAXIMIN 550 MG/1
TWICE A DAY TABLET ORAL TWICE A DAY
Refills: 3 | Status: ON HOLD | COMMUNITY
Start: 2016-10-06

## 2025-01-13 RX ORDER — NADOLOL 20 MG/1
TABLET ORAL
Refills: 0 | Status: ACTIVE | COMMUNITY
Start: 2019-07-09

## 2025-01-13 RX ORDER — METHOCARBAMOL TABLETS 500 MG/1
TABLET, COATED ORAL
Qty: 90 TABLET | Status: ACTIVE | COMMUNITY

## 2025-01-13 RX ORDER — DAPAGLIFLOZIN 10 MG/1
TABLET, FILM COATED ORAL
Refills: 0 | Status: ON HOLD | COMMUNITY
Start: 2019-07-09

## 2025-01-13 RX ORDER — INSULIN ASPART 100 [IU]/ML
30 UNIT DAILY INJECTION, SOLUTION INTRAVENOUS; SUBCUTANEOUS
Refills: 0 | Status: ACTIVE | COMMUNITY
Start: 2019-07-09

## 2025-01-13 RX ORDER — INSULIN GLARGINE AND LIXISENATIDE 100; 33 U/ML; UG/ML
INJECTION, SOLUTION SUBCUTANEOUS
Refills: 0 | Status: ON HOLD | COMMUNITY
Start: 2019-07-09

## 2025-01-13 RX ORDER — ALBUTEROL SULFATE 90 UG/1
USAR COMO NECESITE AEROSOL, METERED RESPIRATORY (INHALATION)
Qty: 8.5 GRAM | Refills: 0 | Status: ACTIVE | COMMUNITY

## 2025-01-13 RX ORDER — ZOLPIDEM TARTRATE 10 MG/1
TABLET ORAL
Qty: 30 TABLET | Status: ACTIVE | COMMUNITY

## 2025-01-13 RX ORDER — LACTULOSE 10 G/15ML
TWICE A DAY 30 ML PO SOLUTION ORAL TWICE A DAY
Refills: 4 | Status: ON HOLD | COMMUNITY
Start: 2017-03-21

## 2025-01-13 RX ORDER — TAMSULOSIN HYDROCHLORIDE 0.4 MG/1
CAPSULE ORAL
Refills: 0 | Status: ON HOLD | COMMUNITY
Start: 2019-07-09

## 2025-01-13 RX ORDER — COLCHICINE 0.6 MG/1
TABLET, FILM COATED ORAL TWICE A DAY
Refills: 0 | Status: ON HOLD | COMMUNITY
Start: 2019-07-09

## 2025-01-13 RX ORDER — OLANZAPINE 5 MG/1
TABLET, FILM COATED ORAL
Qty: 60 TABLET | Status: ACTIVE | COMMUNITY

## 2025-01-13 RX ORDER — FEBUXOSTAT 80 MG/1
TABLET ORAL
Refills: 0 | Status: ON HOLD | COMMUNITY
Start: 2019-07-09

## 2025-01-13 NOTE — HPI-TODAY'S VISIT:
Patient seen today via telehealth by agreement and consent of patient in light of current COVID-19 pandemic. I used video conferencing during the visit. The patient encounter is appropriate and reasonable under the circumstances given the patient's particular presentation at this time. The patient has been advised of the followin) the potential risks and limitations of this mode of treatment (including but not limited to the absence of in-person examination); 2) the right to refuse telehealth services at any point without affecting the right to future care; 3) the right to receive in-person services, included immediately after this consultation if an urgent need arises; 4) information, including identifiable images or information from this telehealth consult, will only be shared in accordance with HIPPA regulations. Any and all of the patient's and/or patient's family member's questions on this issue have been answered. The patient has verbally consented to be treated via telehealth services. The patient has also been advised to contact this office for worsening conditions or problems, and seek emergency medical treatment and/or call 911 if the patient deems either necessary.   More than half of the face-to-face time used for counseling and coordination of care.  The patient received telehealth services at: home  10/16Patient with good general state, with the complaint above.MELD score is 16Elevated CEA and liver enzymes Normal  AFP.Doppler done  No ascites and normal portal flowRUQ us: smooth liver contour, no ascites, no cholecystitis.EGD with evidence of esophageal varice grade I-II and portal gastropathy No sign of infection, edema, bleeding or AMS. Wife state patient is lethargic at times and complaints  of difficulty to concentrate in his thoughts.Plan:Continue with Nadolol, will start lactulose and Xifaxan, no diuretics needs for now, he will have control in his fluids intake.Referral to a tertiary center. Probably TIP.3/17Patient with elevated ammonia level. Patient and wife advised that he should not drive due to the high risk of  have a Car accident. due to encephalopathy. MELD score is 9.Patient with ascites, elevated ammonia, weakness tired, sleepy. Bilateral legs edema. Plan: Continue with Nadolol, lactulose, will start with furosemide 20 mg and xifaxan. Diet low in Na and fluid restrictionwill do abdomen US to evaluated for paracentesis / patient with no abdominal discomfort, claim some SOB and increasing in weight.Patient went to [facility] /  candidate for transplant.  : patient with cirrhosis with evidence of esophageal varices, no banding, next EGD in . no evidence of ascites will do ultrasound and will check ammonia and AFP. Will follow in 2 months.  : pateint comes after 2 years, pateint as no follow up with us for his cirrhosis will need labs and ultrasound  as per patient was check by his PCP. Will follow closkimberly and will review the labs to plan EGD and colonoscopy. heme positive in stool. Will plan first cardiac clearcamce and  will plan EGD and colonoscopy at [facility].  :  Patient with cirrhosis last seen in , 3 years ago with no further follow-up.  Patient had an upper endoscopy that year with evidence of esophageal varices grade 3 at that is post banding and food was pertaining to the stomach, reason why a colonoscopy could not be performed.  Was recommended close follow-up in few weeks to repeat endoscopy and prep for the colonoscopy, gastric empty study was performed and was normal.  Patient never follow-up with us, until now.Patient feels well in the past has a low meld score, will need labs and an ultrasound with Doppler of the abdomen.  Will check AFP, will calculate meld score, will check ammonia levels, and will do a close follow-up to arrange endoscopy and colonoscopy.  This follow-up will be in the office NO through telehealth for clinical evaluation and to plan endoscopic evaluation.  :  Patient had ultrasound of the liver and Doppler with normal direct flow within the portal splenic venous system.  SMV was not visualized.  Patent hepatic vein and artery.  Mild hepatitis and moderate splenomegaly.  Gallbladder and biliary ducts are normal, liver length of 15.6 cm the liver demonstrate normal shape and intravenous echotexture consistent with history of cirrhosis.  No mass was visualized.  Labs were performed with normal AFP just mild elevation of glucose and CBC with platelet count 141,000, normal hemoglobin.  Pending ammonia level, partial results. AFP 1.6. Will plan EGD and colonoscopy at the hospital. : Patient had  upper endoscopy and colonoscopy.  The upper endoscopy showed distal esophageal varices grade 1 through 2 no stigmata of recent bleeding no banding was performed, small hiatal hernia, portal gastropathy, small polyp of the antrum that could be inflammatory biopsies were taken, and normal duodenum.  The colonoscopy showed rectal varices, small internal hemorrhoids, and the preparation was inadequate in some areas will need to repeat colonoscopy within 3 months pathology report showed gastric polyp polypoid mucosa demonstrating reactive and hyperplastic features, no dysplasia or malignancy because of the poor preparation of the colon we will need to repeat colonoscopy again will need to be done at the hospital setting. : Patient with personal h/o cirrhosis, did not follow with us since . Was recommended then to repeat a colonoscopy, now will need EGD and colonoscopy. Patient will need labs and w/u for his cirrhosis, esophageal varices screening, HHC surveillance, Image study and labs are pending. Will need records and will plan EGD and colonoscopy. Patient was hospitalized recently at [facility] CT scan showed moderate wall thickening in the stomach at the gastric antrum can be seen in gastritis and nodular surface contour of the liver compatible with cirrhosis.  Patient had respiratory syncytial virus detected on that admission and an infection in the urine, just trace of leukocyte esterase and WBC 3-5 per feel CBC with evidence of anemia with a hemoglobin of 12.2 platelet count of 534,000 with normal WBC CMP with elevated glucose of 168 normal creatinine normal liver panel patient with cirrhosis recent CT scan with no evidence of ascites liver profile seems to be normal I do not have any AFP that will be ordered for surveillance of HCC, will check ammonia level, will order upper endoscopy and colonoscopy that will need to be done at the hospital.  Will follow closely.

## 2025-03-03 ENCOUNTER — OFFICE VISIT (OUTPATIENT)
Dept: URBAN - METROPOLITAN AREA CLINIC 63 | Facility: CLINIC | Age: 70
End: 2025-03-03

## 2025-05-13 ENCOUNTER — P2P PATIENT RECORD (OUTPATIENT)
Age: 70
End: 2025-05-13

## 2025-06-02 ENCOUNTER — OFFICE VISIT (OUTPATIENT)
Dept: URBAN - METROPOLITAN AREA CLINIC 63 | Facility: CLINIC | Age: 70
End: 2025-06-02
Payer: COMMERCIAL

## 2025-06-02 DIAGNOSIS — D12.6 COLON ADENOMA: ICD-10-CM

## 2025-06-02 DIAGNOSIS — K70.30 ALCOHOLIC CIRRHOSIS OF LIVER WITHOUT ASCITES: ICD-10-CM

## 2025-06-02 DIAGNOSIS — I85.00 ESOPHAGEAL VARICES WITHOUT BLEEDING (HCC): ICD-10-CM

## 2025-06-02 DIAGNOSIS — E11.9 TYPE 2 DIABETES MELLITUS WITHOUT COMPLICATIONS: ICD-10-CM

## 2025-06-02 PROCEDURE — 99214 OFFICE O/P EST MOD 30 MIN: CPT | Performed by: INTERNAL MEDICINE

## 2025-06-02 RX ORDER — ALBUTEROL SULFATE 90 UG/1
USAR COMO NECESITE AEROSOL, METERED RESPIRATORY (INHALATION)
Qty: 8.5 GRAM | Refills: 0 | Status: ACTIVE | COMMUNITY

## 2025-06-02 RX ORDER — BISACODYL 5 MG/1
TABLET, SUGAR COATED ORAL TWICE A DAY
Refills: 0 | Status: ACTIVE | COMMUNITY
Start: 2019-07-09

## 2025-06-02 RX ORDER — TAMSULOSIN HYDROCHLORIDE 0.4 MG/1
CAPSULE ORAL
Refills: 0 | Status: ACTIVE | COMMUNITY
Start: 2019-07-09

## 2025-06-02 RX ORDER — INSULIN ASPART 100 [IU]/ML
30 UNIT DAILY INJECTION, SOLUTION INTRAVENOUS; SUBCUTANEOUS
Refills: 0 | Status: ACTIVE | COMMUNITY
Start: 2019-07-09

## 2025-06-02 RX ORDER — OLANZAPINE 5 MG/1
TABLET, FILM COATED ORAL
Qty: 60 TABLET | Status: ACTIVE | COMMUNITY

## 2025-06-02 RX ORDER — GABAPENTIN 300 MG/1
CAPSULE ORAL TWICE A DAY
Refills: 0 | Status: ACTIVE | COMMUNITY
Start: 2019-07-09

## 2025-06-02 RX ORDER — METHOCARBAMOL TABLETS 500 MG/1
TABLET, COATED ORAL
Qty: 90 TABLET | Status: ACTIVE | COMMUNITY

## 2025-06-02 RX ORDER — ZOLPIDEM TARTRATE 10 MG/1
TABLET ORAL
Qty: 30 TABLET | Status: ACTIVE | COMMUNITY

## 2025-06-02 RX ORDER — DICLOFENAC SODIUM 75 MG/1
TOMAR 1 TABLETA VIA ORAL 2 VECES AL DIA TABLET, DELAYED RELEASE ORAL
Qty: 60 EACH | Refills: 0 | Status: ACTIVE | COMMUNITY

## 2025-06-02 RX ORDER — TIRZEPATIDE 5 MG/.5ML
INJECTION, SOLUTION SUBCUTANEOUS
Qty: 2 MILLILITER | Status: ACTIVE | COMMUNITY

## 2025-06-02 RX ORDER — GLIPIZIDE AND METFORMIN HCL 5; 500 MG/1; MG/1
TOMAR 1 TABLETA VIA ORAL 2 VECES AL DIA TABLET, FILM COATED ORAL
Qty: 180 EACH | Refills: 0 | Status: ACTIVE | COMMUNITY

## 2025-06-02 RX ORDER — METFORMIN HCL 1000 MG/1
TABLET ORAL TWICE A DAY
Refills: 0 | Status: ACTIVE | COMMUNITY
Start: 2019-07-09

## 2025-06-02 RX ORDER — NADOLOL 20 MG/1
TABLET ORAL
Refills: 0 | Status: ACTIVE | COMMUNITY
Start: 2019-07-09

## 2025-06-02 NOTE — HPI-TODAY'S VISIT:
Patient seen today via telehealth by agreement and consent of patient in light of current COVID-19 pandemic. I used video conferencing during the visit. The patient encounter is appropriate and reasonable under the circumstances given the patient's particular presentation at this time. The patient has been advised of the followin) the potential risks and limitations of this mode of treatment (including but not limited to the absence of in-person examination); 2) the right to refuse telehealth services at any point without affecting the right to future care; 3) the right to receive in-person services, included immediately after this consultation if an urgent need arises; 4) information, including identifiable images or information from this telehealth consult, will only be shared in accordance with HIPPA regulations. Any and all of the patient's and/or patient's family member's questions on this issue have been answered. The patient has verbally consented to be treated via telehealth services. The patient has also been advised to contact this office for worsening conditions or problems, and seek emergency medical treatment and/or call 911 if the patient deems either necessary.   More than half of the face-to-face time used for counseling and coordination of care.  The patient received telehealth services at: home  10/16Patient with good general state, with the complaint above.MELD score is 16Elevated CEA and liver enzymes Normal  AFP.Doppler done  No ascites and normal portal flowRUQ us: smooth liver contour, no ascites, no cholecystitis.EGD with evidence of esophageal varice grade I-II and portal gastropathy No sign of infection, edema, bleeding or AMS. Wife state patient is lethargic at times and complaints  of difficulty to concentrate in his thoughts.Plan:Continue with Nadolol, will start lactulose and Xifaxan, no diuretics needs for now, he will have control in his fluids intake.Referral to a tertiary center. Probably TIP.3/17Patient with elevated ammonia level. Patient and wife advised that he should not drive due to the high risk of  have a Car accident. due to encephalopathy. MELD score is 9.Patient with ascites, elevated ammonia, weakness tired, sleepy. Bilateral legs edema. Plan: Continue with Nadolol, lactulose, will start with furosemide 20 mg and xifaxan. Diet low in Na and fluid restrictionwill do abdomen US to evaluated for paracentesis / patient with no abdominal discomfort, claim some SOB and increasing in weight.Patient went to [facility] /  candidate for transplant.  : patient with cirrhosis with evidence of esophageal varices, no banding, next EGD in . no evidence of ascites will do ultrasound and will check ammonia and AFP. Will follow in 2 months.  : pateint comes after 2 years, pateint as no follow up with us for his cirrhosis will need labs and ultrasound  as per patient was check by his PCP. Will follow closkimberly and will review the labs to plan EGD and colonoscopy. heme positive in stool. Will plan first cardiac clearcamce and  will plan EGD and colonoscopy at [facility].  :  Patient with cirrhosis last seen in , 3 years ago with no further follow-up.  Patient had an upper endoscopy that year with evidence of esophageal varices grade 3 at that is post banding and food was pertaining to the stomach, reason why a colonoscopy could not be performed.  Was recommended close follow-up in few weeks to repeat endoscopy and prep for the colonoscopy, gastric empty study was performed and was normal.  Patient never follow-up with us, until now.Patient feels well in the past has a low meld score, will need labs and an ultrasound with Doppler of the abdomen.  Will check AFP, will calculate meld score, will check ammonia levels, and will do a close follow-up to arrange endoscopy and colonoscopy.  This follow-up will be in the office NO through telehealth for clinical evaluation and to plan endoscopic evaluation.  :  Patient had ultrasound of the liver and Doppler with normal direct flow within the portal splenic venous system.  SMV was not visualized.  Patent hepatic vein and artery.  Mild hepatitis and moderate splenomegaly.  Gallbladder and biliary ducts are normal, liver length of 15.6 cm the liver demonstrate normal shape and intravenous echotexture consistent with history of cirrhosis.  No mass was visualized.  Labs were performed with normal AFP just mild elevation of glucose and CBC with platelet count 141,000, normal hemoglobin.  Pending ammonia level, partial results. AFP 1.6. Will plan EGD and colonoscopy at the hospital. : Patient had  upper endoscopy and colonoscopy.  The upper endoscopy showed distal esophageal varices grade 1 through 2 no stigmata of recent bleeding no banding was performed, small hiatal hernia, portal gastropathy, small polyp of the antrum that could be inflammatory biopsies were taken, and normal duodenum.  The colonoscopy showed rectal varices, small internal hemorrhoids, and the preparation was inadequate in some areas will need to repeat colonoscopy within 3 months pathology report showed gastric polyp polypoid mucosa demonstrating reactive and hyperplastic features, no dysplasia or malignancy because of the poor preparation of the colon we will need to repeat colonoscopy again will need to be done at the hospital setting. : Patient with personal h/o cirrhosis, did not follow with us since . Was recommended then to repeat a colonoscopy, now will need EGD and colonoscopy. Patient will need labs and w/u for his cirrhosis, esophageal varices screening, C surveillance, Image study and labs are pending. Will need records and will plan EGD and colonoscopy. Patient was hospitalized recently at [facility] CT scan showed moderate wall thickening in the stomach at the gastric antrum can be seen in gastritis and nodular surface contour of the liver compatible with cirrhosis.  Patient had respiratory syncytial virus detected on that admission and an infection in the urine, just trace of leukocyte esterase and WBC 3-5 per feel CBC with evidence of anemia with a hemoglobin of 12.2 platelet count of 534,000 with normal WBC CMP with elevated glucose of 168 normal creatinine normal liver panel patient with cirrhosis recent CT scan with no evidence of ascites liver profile seems to be normal I do not have any AFP that will be ordered for surveillance of HCC, will check ammonia level, will order upper endoscopy and colonoscopy that will need to be done at the hospital.  Will follow closely. : Patient with personal history of cirrhosis was recommended labs, lipid panel is normal except LDL that is at 111, CMP with elevated glucose of 542, normal creatinine, normal LFTs.  Hemoglobin A1c is 7.0.  CBC show normal WBC with low hemoglobin of 12 platelet count of 145,000.  Recommended test for his liver like alpha-fetoprotein was not performed.  Patient understand the high risk of HCC and will need to do labs and ultrasound of the liver in a regular basis. ending EGD and colonoscopy will be done at [facility]. Will follow in 6 months.

## 2025-06-02 NOTE — PHYSICAL EXAM GASTROINTESTINAL
Abdomen , soft, nontender, nondistended , no guarding or rigidity , no masses palpable , normal bowel sounds , Liver and Spleen,  no hepatosplenomegaly , liver nontender, ventral hernia.

## 2025-06-10 LAB
AFP, SERUM: 2.1
AFP-L3%, SERUM: (no result)